# Patient Record
Sex: MALE | URBAN - METROPOLITAN AREA
[De-identification: names, ages, dates, MRNs, and addresses within clinical notes are randomized per-mention and may not be internally consistent; named-entity substitution may affect disease eponyms.]

---

## 2020-08-06 ENCOUNTER — APPOINTMENT (RX ONLY)
Dept: URBAN - METROPOLITAN AREA CLINIC 26 | Facility: CLINIC | Age: 40
Setting detail: DERMATOLOGY
End: 2020-08-06

## 2020-08-06 DIAGNOSIS — L82.1 OTHER SEBORRHEIC KERATOSIS: ICD-10-CM

## 2020-08-06 PROCEDURE — ? LIQUID NITROGEN

## 2020-08-06 PROCEDURE — 17110 DESTRUCTION B9 LES UP TO 14: CPT

## 2020-08-06 PROCEDURE — ? COUNSELING

## 2020-08-06 PROCEDURE — ? ADDITIONAL NOTES

## 2020-08-06 ASSESSMENT — LOCATION SIMPLE DESCRIPTION DERM: LOCATION SIMPLE: SCALP

## 2020-08-06 ASSESSMENT — LOCATION DETAILED DESCRIPTION DERM: LOCATION DETAILED: RIGHT CENTRAL FRONTAL SCALP

## 2020-08-06 ASSESSMENT — LOCATION ZONE DERM: LOCATION ZONE: SCALP

## 2020-08-06 NOTE — HPI: EVALUATION OF SKIN LESION(S)
What Type Of Note Output Would You Prefer (Optional)?: Bullet Format
Hpi Title: Evaluation of a Skin Lesion
Family Member: Father, mother and aunt